# Patient Record
Sex: FEMALE | Race: WHITE
[De-identification: names, ages, dates, MRNs, and addresses within clinical notes are randomized per-mention and may not be internally consistent; named-entity substitution may affect disease eponyms.]

---

## 2018-07-12 ENCOUNTER — HOSPITAL ENCOUNTER (EMERGENCY)
Dept: HOSPITAL 95 - ER | Age: 63
Discharge: HOME | End: 2018-07-12
Payer: MEDICARE

## 2018-07-12 VITALS — WEIGHT: 133 LBS | BODY MASS INDEX: 24.48 KG/M2 | HEIGHT: 62 IN

## 2018-07-12 DIAGNOSIS — E03.9: ICD-10-CM

## 2018-07-12 DIAGNOSIS — Z88.2: ICD-10-CM

## 2018-07-12 DIAGNOSIS — Z79.899: ICD-10-CM

## 2018-07-12 DIAGNOSIS — G40.909: ICD-10-CM

## 2018-07-12 DIAGNOSIS — E11.9: ICD-10-CM

## 2018-07-12 DIAGNOSIS — R07.9: Primary | ICD-10-CM

## 2018-07-12 DIAGNOSIS — F25.9: ICD-10-CM

## 2018-07-12 DIAGNOSIS — Z88.0: ICD-10-CM

## 2018-07-12 DIAGNOSIS — Z79.84: ICD-10-CM

## 2018-07-12 DIAGNOSIS — Z79.2: ICD-10-CM

## 2018-07-12 LAB
ALBUMIN SERPL BCP-MCNC: 3.5 G/DL
ALBUMIN/GLOB SERPL: 1 {RATIO}
ALT SERPL W P-5'-P-CCNC: 35 U/L
ANION GAP SERPL CALCULATED.4IONS-SCNC: 9 MMOL/L
AST SERPL W P-5'-P-CCNC: 33 U/L
BASOPHILS # BLD AUTO: 0.03 K/MM3
BASOPHILS NFR BLD AUTO: 1 %
BILIRUB SERPL-MCNC: 0.1 MG/DL
BUN SERPL-MCNC: 32 MG/DL
CALCIUM SERPL-MCNC: 9.8 MG/DL
CHLORIDE SERPL-SCNC: 103 MMOL/L
CO2 SERPL-SCNC: 27 MMOL/L
CREAT SERPL-MCNC: 0.94 MG/DL
DEPRECATED RDW RBC AUTO: 44.5 FL
EOSINOPHIL # BLD AUTO: 0.31 K/MM3
EOSINOPHIL NFR BLD AUTO: 6 %
ERYTHROCYTE [DISTWIDTH] IN BLOOD BY AUTOMATED COUNT: 12.2 %
GLOBULIN SER CALC-MCNC: 3.6 G/DL
GLUCOSE SERPL-MCNC: 142 MG/DL
HCT VFR BLD AUTO: 36.1 %
HGB BLD-MCNC: 11.8 G/DL
IMM GRANULOCYTES # BLD AUTO: 0.03 K/MM3
IMM GRANULOCYTES NFR BLD AUTO: 1 %
LYMPHOCYTES # BLD AUTO: 1.71 K/MM3
LYMPHOCYTES NFR BLD AUTO: 35 %
MCHC RBC AUTO-ENTMCNC: 32.7 G/DL
MCV RBC AUTO: 98 FL
MONOCYTES # BLD AUTO: 0.5 K/MM3
MONOCYTES NFR BLD AUTO: 10 %
NEUTROPHILS # BLD AUTO: 2.33 K/MM3
NEUTROPHILS NFR BLD AUTO: 48 %
NRBC # BLD AUTO: 0 K/MM3
NRBC BLD AUTO-RTO: 0 /100 WBC
PLATELET # BLD AUTO: 197 K/MM3
POTASSIUM SERPL-SCNC: 4.4 MMOL/L
PROT SERPL-MCNC: 7.1 G/DL
SODIUM SERPL-SCNC: 139 MMOL/L
TROPONIN I SERPL-MCNC: <0.015 NG/ML

## 2020-10-19 NOTE — NUR
TRANSFER NOTE
 
PT ARRIVED  FROM 328 VIA STRETCHER @ 2250. PT SETTLED IN BED, IV ABX
RUNNING, R WRIST RESTRAINT CHECKED, PT'S BRIEF CHANGED AND PT REPOSITIONED.
EVEN AND UNLABORED RESPIRATIONS. BED ALARM ON, BED IN LOWERED POSITION WITH
HOB ELEVATED, CALL LIGHT AND PERSONAL ITEMS WITHIN REACH. NO APPARENT NEEDS OR
DISTRESS AT THIS TIME. WILL CONTINUE TO MONITOR.

## 2020-10-19 NOTE — NUR
10/19/20   2250  PT TRANSFERRED TO SCU AND ROOM 350 VIA BED. PT WAS CONFUSED
EARLIER AND PULLED OUT HER IV. RN NOTIFIED ON-CALL MD OF EVENT WHEN NOTIFYING
MD OF POSITIVE BLOOD CULTURES. PT HIGH RISK FOR FALLS AND PULLING OUT FURTHER
IV'S. REPORT GIVEN TO DMITRY WOLF.

## 2020-10-19 NOTE — NUR
PT AWAKE TO VOICE. DENIES PAIN. OFTEN PARROTS WHAT I SAY. ALSO IF I HUMM, SHE
REPEATS SOME. ATTENDS AND GOWN WET, I CHANGED, NEW GOWN. H/R REG, NO MURMER
NOTED. NO TELE. LUNGS CLEAR, RESP EASY, UNLABORED. ON R/A. BT X4 LAST BM
UNKNOWN, VOIDS INCONT. IN ATTENDS. NOW CDI. PT FIGHTS TURNING. MOVES BACK TO
HER BACK. UNABLE TO ASSESS IF SWALLOWS SAFELY, AS FIGHTS SPOON TO MOUTH FOR
SIP WATER. HELD PILLS. WILL NOTIFY DR WHEN SEE.

## 2020-10-19 NOTE — NUR
SPOKE WITH DR MCKEON. OKAYED RESP PANEL WITH PCR. ALSO SWALLOW EVAL AS PT NOT
SEEMING TO SWALLOW ADEQUATELY.

## 2020-10-19 NOTE — NUR
PT BEGINNING TO BE AWAKE MORE. NO C.O PAIN. DID RIP O2 N/C OFF DURING DAY, HAS
BEEN BETTER ABOUT THIS THIS AFT. WAS ABLE TO TOLERATE PCR NASAL TEST. HAS NOT
EATEN TODAY. HAS NOT BEEN AWAKE AND ALERT ENOUGH TO FOLLOW INST.
DISCUSSED WITH DR THAT IS LIKELY UNSAFE UNTIL MORE AWAKE. HE IS AWARE
PT NOT EATING OR TAKING PO MEDS. SWALLOW EVAL ORDERED. PERHAPS NITE RN CAN DO
BEDSIDE IF IMPROVES SOME MORE. BED INL OW POSITION, CALL LITE IN REACH, BED
ALARM ON FOR SAFETY

## 2020-10-19 NOTE — NUR
LAB CALLED WAS PASSED TO CHARGE RN. REPEAT COVID WAS ALSO POSITIVE. NO CHANGE
IN STATUS, DR NOT RENOTIFIED.

## 2020-10-19 NOTE — NUR
NIGHT SHIFT SUMMARY
 PT ARRIVED TO UNIT VIA STRETCHER AT 0339. PT IS A/O X0. NOT ABLE TO TELL
STAFF THE CORRECT HER NAME AND CANNOT COMPREHEND WHAT NURSE IS SAYING. SPEECH
IS MINIMAL AND GARBLED. DOES NOT FOLLOW COMMANDS. ABLE TO AWAKE TO PAIN AND
SOMETIMES VOICE. ABLE TO OPEN EYES SPONTANEOUSLY. CURRENTLY ON 3L O2 SATTINING
IN THE LOW TO MID 90'S. NO SOB NOTED. PT DOES NOT WEAR O2 AT BASELINE PER RN
FROM [facility] WHERE SHE RESIDES. BED ALARM ON, CALL LIGHTWIN WITHIN REACH.
VSS. REPORT GIVEN TO ONCOMING RN.

## 2020-10-20 NOTE — NUR
PATIENT ORIENTED TO SELF ONLY. FEVER THIS AM, TYLENOL SUPPOSITORY GIVEN AND
PATIENT HAS BEEN AFEBRILE THE REST OF THIS SHIFT. VSS, ON 3-4LO2 TO MAINTAIN
SATS. PLACED ON MS/ADA DIET. TAKES PILLS CRUSHED IN CHOCOLATE PUDDING.
INCONTIENT OF URINE STOOL. POOR INTAKE, VERY PARTICULAR ABOUT WHAT SHE LIKES.
STARTED ON PPN THIS EVENING. BLOOD SUGARS Q6 HOURS, COVERAGE NEEDED THIS
SHIFT. SOFT R WRIST RESTRAINT IN PLACE TO PROTECT LINES. Q2H TURN REQUIRING 2
ASSIST. DROPLET PRECAUTIONS DUE TO POSITIVE COVID, STARTED ON REMDESIVIR
TODAY. FALL PRECAUTIONS IN PLACE PER UNIT PROTOCOL. PATIENT DOES NOT CALL FOR
ASSISTANCE, BUT HAS NOT ATTEMPTED TO GET OOB TODAY.

## 2020-10-20 NOTE — NUR
SHIFT SUMMARY
 
NO ACUTE CHANGES THIS
SHIFT. Q2H RESTRAINT CHECKS, Q2H TURN WITH PILLOWS, Q4H CBG
CHECKS. PT AT TIMES CAN TELL ME HER NAME, BUT MOST OF THE TIME IS NOT EVEN
ORIENTED TO SELF. MEDICATED FOR FEVER X1 PER MAR. PT IS LAYING IN BED, EYES
OPEN, EVEN AND UNLABORED RESPIRATIONS. BED IN LOWERED POSITION WITH BED ALARM
ON. CALL LIGHT AND PERSONAL ITEMS WITHIN REACH. NO APPARENT NEEDS OR DISTRESS
AT THIS TIME, WILL CONTINUE TO MONITOR UNTIL REPORT GIVEN TO DAY RN.

## 2020-10-21 NOTE — NUR
SHIFT SUMMARY
PT ALERT TO VERBAL STIMULI. AT MORNING ASSESSMENT, PT WAS NOT ANSWERING NURSE
QUESTIONS AND WAS SOMEWHAT COMBATIVE WTH ATTENDS CHANGE. NOT ABLE TO FOLLOW
SWALLOWING CUES FROM NURSE. THEN SPEECH THERAPIST IN TO ASSESS SWALLOW, PT WAS
ABLE TO SWALLOW SOME APPLEJUICE. AT MORNING MEDICATION ADMINISTRATION, PT WAS
ABLE TO SWALLOW MEDS CRUSHED IN CHOCOLATE PUDDING. DR MCKEON NOTIFIED. PT WARM
TO TOUCH, TEMP 100.2 AT THIS TIME. PT HAS THIN SHEET ON AT THIS TIME. PPN
INFUSING PER EMAR. DR MCKEON NOTIFIED OF ELEVATED CBG AT 1757, NO NEW ORDERS AT
THIS TIME. PT CONTINUES TO HAVE DRY COUGH. BED IN LOW POSITION, CALL LIGHT
WITHIN REACH, BED ALARM ON.

## 2020-10-21 NOTE — NUR
SHIFT SUMMARY
 
PT HAS RESTED IN BED MOST OF THE NIGHT. PT IS FIGETY AND DOES NOT LIKE WHEN
STAFF TOUCHES HER. SHE ATTEMPTS TO SWAT AWAY HANDS. PT IS A/O TO SELF ONLY,
AND SELDMOMLY SPEAKS. POOR ORAL INTAKE. PPN WAS STARTED YESTERDAY. PT PULLS ON
LINES WARRANTING THE NEED FOR R WRIST RESTRAINTS. PT HAS BEEN MORE COOPERATIVE
WITH SHIFT AND HAS NOT MADE ANY ATTEMPT AT PULLING OUT HER IV. SOFT RIGHT
WRIST RESTRAINT WAS DC'D AT THE BEGINNING OF THE SHIFT. PT REMAINS ON 4L O2,
TO MAINTAIN SATS. DAO BIOX IN PLACE WITH NO ACUTE CHANGES. PT FEBRILE THIS
SHIFT. MEDIACTED WITH RECTAL TYLENOL AS ORDERED. PT REMAINS INCONTINENT,
REQUIRING TWO PERSON CHANGES AND TURNS. NO ACUTE CHANGES IN PT ASSESSMENT. BED
IN LOWEST POSITION, CALL LIGHT WITHIN REACH.

## 2020-10-22 NOTE — NUR
Attended rapid response. Call placed to pt's guardian, Naomie. In accordance
with pt's comfort measures only POLST and Naomie's consent, pt was placed on
comfort measures.

## 2020-10-22 NOTE — NUR
SHIFT SUMMARY/RRT INITIATED TODAY
PT BEGAN AM WITH AxOx1, SELF. PT PRIMARILY NON VERBAL, BUT ALERT AND
RESPONSIVE TO QUESTIONS AND PHYSICAL TOUCH. PT SWINGS ARMS AND LEGS IN PROTEST
TO SOME CARE. RESPONSIVE TO REQUESTS TO STOP FLAILING/KICKING. SPEECH THERAPY
EVAL THIS AM WITH DIET CHANGES TO PUREE. PT TOLERATING MEALS. BLOOD GLUCOSE
CONSISTENTLY ELEVATED TODAY IN -600'S.  DR MCKEON NOTIFIED AND INSULIN
ORDERS CHANGED AND GIVEN. PPN DC'D AND FLUIDS STARTED. PT SATS IN THE HIGH
80-LOW 90'S. LS DIM IN BASED. INCONTINENT. REPOSITIONING Q2 HOURS. MONITOR
TECH NOTIFIED LUCIANO CUNNINGHAM OF SEIZURE LIKE ACTIVITY AT APPROX 1600. RRT WAS
CALLED AFTER VERIFYING POSS SEIZURE AND DESATS. SEE RRT INITIATION NOTE FOR
DETAILS. PT PLACED ON COMFORT CARE AT THIS TIME. CURRENTLY LAYING IN BED, NOT
ROUSABLE TO STIMUMI. SAT'S BACK UP IN 90'S ON 5L O2 VIA NC. NURSE GOODEN AT
[facility] UPDATED ON PT STATUS.

## 2020-10-23 NOTE — NUR
10/23/20  0353  VIDEO MONITOR TECH CALLED TO INFORM RN THAT PT WAS SEIZURING.
RN AND CHARGE RN,DEBBIE, IN ROOM. PT UNRESPONSIVE. PUPILS AT SIZE 4. MILD
GENERAL SEURES APPARENT. HR= 117. O2 SAT = 90% ON 5LPM. ATIVAN 1 MG IV GIVEN
AT 0405.  PT STOPPED SEIZURE AT 0408.

## 2020-10-23 NOTE — NUR
pt lightly seizing. legs and arms. called dr jackson. conflicting orders and is
on comfort care. dr jackson updating orders. pt stopped light seizures. giving
keppra iv.

## 2020-10-23 NOTE — NUR
10/23/20  0615  VIDEO MONITOR TECH CALLED THAT PT "WAS SHAKING."  CHARGE RN,
DEBBIE ENTERED ROOM AND PT HAVING MILD GRAND MAL SEIZURE. ATIVAN 2 MG IV GIVEN.
SEIZURES GONE ALMOST IMMEDIATELY. PT NON-RESPONSIVE TO VERBAL STIMULI. PT
COMFORT CARE AND DNR.

## 2020-10-23 NOTE — NUR
LITE SHAKING BLE, BUE SEIZURE TYPE ACTIVITY NOTED. MEDICATED WITH 0.5 MG
ATIVAN ADMIN. PT RESTING. SHAKING SEIZURE TYPE ACTIVITY APPEARS RESOLVED

## 2020-10-23 NOTE — NUR
10/23/20   0000  PT repositioned to rt side with pillows. INCONTINENT BRIEF OF
URINE CHANGED AND CR-CARE GIVEN. PT DOES NOT OPEN EYES ONLY HANDS  AT
LINENS WHEN TOUCHED. REASSURED VERBALLY.

## 2020-10-23 NOTE — NUR
Comfort Measures:
 
Per nursing, pt was having a seizure this am, witnessed by staff. Ativan not
given, as nurse states the seizure resolved before requiring Ativan. Keppra
infusion scheduled for this morning. Nurse reports pt does not appear to be in
pain. Recommend PRN ativan given at first indication of seizure. No other
concerns. Call to Linda to update charge nurse.

## 2020-10-23 NOTE — NUR
PT HAS BEEN NONVERBAL TODAY. RESP ABOUT 30 TODAY MOST OF DAY. NO C/O PAIN. HAS
NOT BEEN AWAKE TO EAT. DID HAVE EPISODE OF SEIZURE LIKE SHAKING IN
EXTREMETIES. MED WITH ATIVAN. PT RELAXED IN MOMENTS. NO NEW CONCERNS TODAY. DR
DID STOP IV FLUIDS. KEEPING KEPPRA. BED IN LOW POSITION, CALL LITE IN REACH,
TURNING AND CHANGING REGULARLY. BED ALARN ON FOR SAFETY. CAMERA ON FOR
NOTIFICATION OF CURRENT SITUATIONS.

## 2020-10-24 NOTE — NUR
SHIFT SUMMARY-
PT. NOT AWAKE T/O THE SHIFT. NO S/S OF PAIN/DISCOMFORT OR SEIZURE ACTIVITY.
REPOSITIONED Q2 AND FOR COMFORT. RECEIVING IV KEPPRA, TOLERATED WELL. VOIDING
LARGE AMOUNT, ATTENDS IN PLACE. ORAL CARE PERFORMED LAST NIGHT.
CALL LIGHT WITHIN REACH AND SIDE RAILS UPX2. WILL CONT TO MONITOR.

## 2020-10-24 NOTE — NUR
[facility]
GIACOMO FROM [facility] CALLED BACK, SHE INFORMED ME TO CALL THE PERSON LISTED
AS GUARDIAN ON THEIR FACE SHEET FOR THE  HOME

## 2020-10-24 NOTE — NUR
FOUND PT TO NOT BE BREATHING AND WITHOUT A PULSE AT 1357, CHARGE RN NOTIFIED,
CALL TO [facility] AND MESSAGE LEFT FOR NURSE GIACOMO

## 2022-04-06 NOTE — NUR
Initial spiritual care note:
 
I responded to Rapid Response, but DID NOT enter room.  Cat was
non-responsive throughout.  No friends or family present.  Cat is now Comfort
Care and seems peaceful.  Prayer for peaceful transition. Detail Level: Zone